# Patient Record
Sex: MALE | Race: WHITE
[De-identification: names, ages, dates, MRNs, and addresses within clinical notes are randomized per-mention and may not be internally consistent; named-entity substitution may affect disease eponyms.]

---

## 2017-06-15 ENCOUNTER — HOSPITAL ENCOUNTER (EMERGENCY)
Dept: HOSPITAL 85 - ER | Age: 67
LOS: 1 days | Discharge: HOME | End: 2017-06-16
Payer: MEDICARE

## 2017-06-15 DIAGNOSIS — E11.43: ICD-10-CM

## 2017-06-15 DIAGNOSIS — Z79.82: ICD-10-CM
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
## 2017-06-15 DIAGNOSIS — Z79.4: ICD-10-CM

## 2017-06-15 DIAGNOSIS — I10: ICD-10-CM

## 2017-06-15 DIAGNOSIS — Z79.899: ICD-10-CM
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
## 2017-06-15 DIAGNOSIS — S96.911A: Primary | ICD-10-CM

## 2017-06-15 PROCEDURE — 99283 EMERGENCY DEPT VISIT LOW MDM: CPT

## 2017-06-15 PROCEDURE — 99282 EMERGENCY DEPT VISIT SF MDM: CPT

## 2017-06-15 NOTE — RADIOLOGY REPORT
HISTORY:

Ankle pain.



COMPARISON:

None.



FINDINGS:

Three views of the ankle obtained.

There is no acute fracture. Post internal fixation of the healing distal fibular fracture with hardwa
re intact and in anatomic alignment. There is no lytic or sclerotic lesion.  The ankle mortise is int
act.  There is diffuse soft tissue swelling.  No significant degenerative changes are noted.



IMPRESSION: 

1.  Post internal fixation of the healing distal fibular fracture with hardware intact and in anatomi
c alignment.

2.  Diffuse soft tissue swelling.



Final Electronic Signature: This report was electronically signed by Ellen Hutchins MD on 6/15/2017
 11:39 PM. 

 [email or web address] /

## 2017-06-15 NOTE — RADIOLOGY REPORT
HISTORY:

Ankle pain.



COMPARISON:

None.



FINDINGS:

2 views of the tibia and fibula obtained.  There is no fracture. The distal fibular fixation hardware
 is better visualized on the dedicated ankle radiographs. There is no lytic or sclerotic lesion.  The
re is no soft tissue swelling.  There is normal alignment and mineralization.



IMPRESSION: Distal fibular hardware better visualized on the dedicated ankle radiographs, otherwise u
nremarkable unremarkable.



Final Electronic Signature: This report was electronically signed by Ellen Hutchins MD on 6/15/2017
 11:41 PM. 

 [email or web address] /

## 2017-06-16 NOTE — ER NURSING DOCUMENTATION
Nurse's Notes                                                                                     

[facility]                                                                         

Name:Iván Mooney                                                                                  

Age:66 yrs                                                                                        

Sex:Male                                                                                          

:1950                                                                                    

MRN:[id number]                                                                                    

Arrival Date:06/15/2017                                                                           

Time:22:35                                                                                        

Account#:[id number]                                                                             

Bed4                                                                                              

Private MD:Physician, No                                                                          

Diagnosis:Ankle Sprain                                                                            

                                                                                                  

Presentation:                                                                                     

06/15                                                                                             

22:46 Acuity: CHANG 3                                                                           mk2 

22:57 Presenting complaint: Patient states: Fell this AM at 0900 injuring right ankle. Has    sj  

      been walking on it all day though very swollen. Care prior to arrival: None. Mechanism      

      of Injury: Fall. Trauma event details: Injury occurred in the [facility]      

      Injury occurred at home. Injury occurred Sandra 15, 2017 Injury occurred at 09:00.            

22:57 Method Of Arrival: Private Vehicle                                                      sj  

                                                                                                  

Historical:                                                                                       

- Allergies: PENICILLINS; SULFA (SULFONAMIDES); SHELLFISH; fela brush;                            

- Home Meds:                                                                                      

  1. levothyroxine oral                                                                           

  2. Albuterol Inhl                                                                               

  3. Combivent Inhl                                                                               

  4. Aspirin Oral                                                                                 

  5. atorvastatin oral                                                                            

  6. Dexilant oral                                                                                

  7. Lexapro Oral                                                                                 

  8. advair HFA                                                                                   

  9. Novolog Sub-Q                                                                                

  10. losartan oral                                                                               

  11. Melatonin Oral                                                                              

- PMHx: HYPOTHYROIDISM; Diabetes - IDDM; hypercholesterolemia; Asthma; Hypertension;              

  DEPRESSION; GERD;                                                                               

- PSHx: Cholecysectomy; back; bilateral ankle plates; Tonsillectomy;                              

- Tetanus: < 10 years.                                                                            

                                                                                                  

                                                                                                  

Screenin:01 Abuse screen: Denies threats or abuse. Denies injuries from another. Nutritional        sj  

      screening: On diabetic diet. Tuberculosis screening: No symptoms or risk factors            

      identified.                                                                                 

                                                                                                  

Assessment:                                                                                       

22:58 General: Appears in no apparent distress, Behavior is cooperative, pleasant. Pain:      sj  

      Denies pain. Neuro: Level of Consciousness is awake, alert, Oriented to person, place,      

      time, event, paresthesias in bilateral lower extremities, baseline. Cardiovascular:         

      Capillary refill < 3 seconds. Respiratory: Respiratory effort is even, unlabored,           

      Respiratory pattern is regular. Injury Description: swelling to ankle.                      

                                                                                                  

Vital Signs:                                                                                      

23:00  / 69; Pulse 70; Resp 16; Temp 98.7(O); Pulse Ox 96% on R/A; Weight 115.21 kg;    sj  

      Height 6 ft. 1 in. (185.42 cm); Pain 0/10;                                                  

                                                                                             

00:32  / 62; Pulse 79; Pulse Ox 95% on R/A; Pain 3/10;                                  2 

06/15                                                                                             

23:00 Body Mass Index 33.51 (115.21 kg, 185.42 cm)                                              

                                                                                                  

Trauma Score (Adult):                                                                             

06/15                                                                                             

23:00 Eye Response: spontaneous(1); Verbal Response: oriented(1); Motor Response: obeys         

      commands(2); Systolic BP: > 89 mm Hg(4); Respiratory Rate: 10 to 29 per min(4); Priscila     

      Score: 15; Trauma Score: 12                                                                 

                                                                                                  

ED Course:                                                                                        

22:36 Patient arrived in ED.                                                                  ma1 

22:36 Physician, No is Private Physician.                                                     ma1 

22:42 Juan Peacock MD is Attending Physician.                                                  tl1 

22:44 Reyna Law RN is Primary Nurse.                                                       2 

22:46 Triage completed.                                                                       2 

23:01 Valuables Given to family. Patient has correct armband on for positive identification.    

      Call light in reach. Side rails up X 1.                                                     

23:02 Affected limb elevated. Ice pack applied.                                                 

23:15 Port Xray Completed.                                                                    daniela 

                                                                                             

00:09 Report received from LAKESHA Li. Warm blanket given. scrub pants given. Pt has walker at [facility] 

      home he prefers to use instead of crutches.                                                 

00:10 Walking boot applied.                                                                   [facility] 

00:22 Resting quietly. Foot is elevated. Ice packs given for take home use.                   2 

00:34 Valuables Remains with patient.                                                         2 

                                                                                                  

Administered Medications:                                                                         

No medications were administered                                                                  

                                                                                                  

                                                                                                  

Outcome:                                                                                          

00:20 Discharge ordered by MD.                                                                1 

00:32 Discharged to home via wheelchair.                                                      2 

00:32 Condition: stable                                                                           

00:32 Discharge Assessment: Patient awake, alert and oriented x 3.  No cognitive and/or           

      functional deficits noted. Patient verbalized understanding of disposition                  

      instructions.  Pt affected extremity is in a boot. Toe color is normal per pt.              

      Immediate cap refill but toes are purple/bluish in color. Family states this is normal.     

                                                                                                  

00:32 Discharge instructions given to patient, family, Instructed on discharge instructions,      

      follow up and referral plans. medication usage.                                             

00:34 Patient left the ED.                                                                    [facility] 

                                                                                                  

Signatures:                                                                                       

Niki Patel Meg, RN                         RN   [facility]                                                  

Juan Peacock MD MD   1                                                  

Mayra, Jen                                sj                                                   

Chema, Arely                             ma1                                                  

                                                                                                  

**************************************************************************************************

## 2017-06-18 NOTE — ER PHYSICIAN DOCUMENTATION
Physician Documentation                                                                           

[facility]                                                                         

Name:Iván Mooney                                                                                  

Age:66 yrs                                                                                        

Sex:Male                                                                                          

:1950                                                                                    

MRN:[id number]                                                                                    

Arrival Date:06/15/2017                                                                           

Time:22:35                                                                                        

Account#:[id number]                                                                             

Bed4                                                                                              

Private MD:Physician, No                                                                          

ED Juan Carrington                                                                            

Disposition:                                                                                      

                                                                                             

12:14 Chart complete.                                                                         tl1 

                                                                                                  

Disposition:                                                                                      

17 00:20 Discharged to Home/Self Care. Impression: Ankle Sprain.                            

- Condition is Good.                                                                              

- Discharge Instructions: Ankle - SPRAIN ANKLE (w/ x-ray).                                        

                                                                                                  

- Medical Reconciliation form form.                                                               

- Follow up: Private Physician; When: 4- 6 days; Reason: Recheck today's complaints,              

  Continuance of care.                                                                            

- Problem is new.                                                                                 

- Symptoms have improved.                                                                         

                                                                                                  

- Notes: SEE YOUR ORTHOPEDIST BACK HOME IN [place] WITHIN A WEEK. KEEP YOUR RIGHT ANKLE           

  ELEVATED AS MUCH AS POSSIBLE.                                                                   

                                                                                                  

                                                                                                  

HPI:                                                                                              

06/15                                                                                             

22:38 This 66 yrs old  Male presents to ER via Private Vehicle with complaints of    tl1 

      Fall Injury.                                                                                

22:38 Details of fall: The patient fell from an upright position. Onset: The                  tl1 

      symptom(s)/episode began/occurred this morning. Associated injuries: The patient            

      sustained right ankle, swelling.                                                            

22:38 Associated signs and symptoms: The patient has no apparent associated signs or          tl1 

      symptoms. He has a h/o DM and severe peripheral neuropathy and chronic venous               

      insufficiency. He is visiting with his wife from [place]. He stumbled this AM and fell,     

      injuring his right ankle. He has no pain perception in his lower legs and has been          

      walking around on it for the last 12 hours or so. Just recently he noticed it was very      

      swollen and thought he should have it checked out. Other than ankle swelling he has no      

      new complaints..                                                                            

                                                                                                  

Historical:                                                                                       

- Allergies: PENICILLINS; SULFA (SULFONAMIDES); SHELLFISH; fela brush;                            

- Home Meds:                                                                                      

  1. levothyroxine oral                                                                           

  2. Albuterol Inhl                                                                               

  3. Combivent Inhl                                                                               

  4. Aspirin Oral                                                                                 

  5. atorvastatin oral                                                                            

  6. Dexilant oral                                                                                

  7. Lexapro Oral                                                                                 

  8. advair HFA                                                                                   

  9. Novolog Sub-Q                                                                                

  10. losartan oral                                                                               

  11. Melatonin Oral                                                                              

- PMHx: HYPOTHYROIDISM; Diabetes - IDDM; hypercholesterolemia; Asthma; Hypertension;              

  DEPRESSION; GERD;                                                                               

- PSHx: Cholecysectomy; back; bilateral ankle plates; Tonsillectomy;                              

- Tetanus: < 10 years.                                                                            

                                                                                                  

                                                                                                  

ROS:                                                                                              

23:05 MS/extremity: Positive for swelling, Negative for pain, tenderness.                     tl1 

23:05 All other systems are negative.                                                             

                                                                                                  

Exam:                                                                                             

23:05 Constitutional:  This is a well developed, well nourished patient who is awake, alert,  tl1 

      and in no acute distress.                                                                   

23:05 Head/face: Exam is negative for acute changes.                                              

23:05 Neck: Exam negative for acute changes.                                                      

23:05 Cardiovascular: Rate: normal.                                                               

23:05 Respiratory:  Respirations: normal.                                                         

23:05 Musculoskeletal/extremity: Extremities:  noted in the right ankle: erythema, swelling,      

      Joints:  the  right ankle displays limited range of motion, swelling,  He cannot            

      perceive pain or tendernes sanywher in his ankle.  There is marked diffuse swelling.        

      Cannot palpate pulses in the feet, but he does have about 3 sec capillary refill..          

23:05 Neuro:  grossly normal.                                                                     

                                                                                                  

Vital Signs:                                                                                      

23:00  / 69; Pulse 70; Resp 16; Temp 98.7(O); Pulse Ox 96% on R/A; Weight 115.21 kg;    sj  

      Height 6 ft. 1 in. (185.42 cm); Pain 0/10;                                                  

16                                                                                             

00:32  / 62; Pulse 79; Pulse Ox 95% on R/A; Pain 3/10;                                  mk2 

06/15                                                                                             

23:00 Body Mass Index 33.51 (115.21 kg, 185.42 cm)                                              

                                                                                                  

Trauma Score (Adult):                                                                             

06/15                                                                                             

23:00 Eye Response: spontaneous(1); Verbal Response: oriented(1); Motor Response: obeys         

      commands(2); Systolic BP: > 89 mm Hg(4); Respiratory Rate: 10 to 29 per min(4); [place]     

      Score: 15; Trauma Score: 12                                                                 

                                                                                                  

Procedures:                                                                                       

23:05 Splinting: Splint applied to medial aspect of right calf, right ankle and medial aspect tl1 

      of right foot using Ortho 3D boot, applied by nurse. Patient tolerated well.                

                                                                                                  

MDM:                                                                                              

22:40 Patient medically screened.                                                             tl1 

23:05 Differential diagnosis: contusion, fracture, sprain, strain. Data reviewed: vital       tl1 

      signs, nurses notes, radiologic studies, plain films, and as a result, I will discharge     

      patient. Test interpretation: by ED physician or midlevel provider: plain radiologic        

      studies. Counseling: I had a detailed discussion with the patient and/or guardian           

      regarding: the historical points, exam findings, and any diagnostic results supporting      

      the discharge/admit diagnosis, radiology results, the need for outpatient follow up, to     

      return to the emergency department if symptoms worsen or persist or if there are any        

      questions or concerns that arise at home. Response to treatment: the patient's symptoms     

      have mildly improved after treatment, and as a result, I will discharge patient.            

      Special discussion: Definite need for orthopedic f/u.                                       

                                                                                                  

06/15                                                                                             

23:43 Order name: ANKLE; 3V COMPLETE RT [id number]                                                 [facility]

06/15                                                                                             

23:44 Order name: TIBIA/FIBULA; 2V RT [id number]                                                   [facility]

06/15                                                                                             

23:18 Order name: Walking Boot; Complete Time: 00:09                                          tl1 

                                                                                                  

Dispensed Medications:                                                                            

No medications were administered                                                                  

                                                                                                  

                                                                                                  

Signatures:                                                                                       

Reyna Law RN                         RN   mk2                                                  

Juan Peacock MD MD   tl1                                                  

Jen Nunez                                                   

                                                                                                  

**************************************************************************************************

## 2018-02-28 ENCOUNTER — HOSPITAL ENCOUNTER (OUTPATIENT)
Dept: HOSPITAL 89 - LAB | Age: 68
End: 2018-02-28
Attending: INTERNAL MEDICINE
Payer: MEDICARE

## 2018-02-28 VITALS — BODY MASS INDEX: 32.32 KG/M2

## 2018-02-28 DIAGNOSIS — E10.49: Primary | ICD-10-CM

## 2018-02-28 LAB — LDLC SERPL-MCNC: 59 MG/DL

## 2018-02-28 PROCEDURE — 36415 COLL VENOUS BLD VENIPUNCTURE: CPT

## 2018-02-28 PROCEDURE — 83036 HEMOGLOBIN GLYCOSYLATED A1C: CPT

## 2018-02-28 PROCEDURE — 82465 ASSAY BLD/SERUM CHOLESTEROL: CPT

## 2018-02-28 PROCEDURE — 84478 ASSAY OF TRIGLYCERIDES: CPT

## 2018-02-28 PROCEDURE — 84443 ASSAY THYROID STIM HORMONE: CPT

## 2018-02-28 PROCEDURE — 82565 ASSAY OF CREATININE: CPT

## 2018-02-28 PROCEDURE — 82570 ASSAY OF URINE CREATININE: CPT

## 2018-02-28 PROCEDURE — 82947 ASSAY GLUCOSE BLOOD QUANT: CPT

## 2018-02-28 PROCEDURE — 84520 ASSAY OF UREA NITROGEN: CPT

## 2018-02-28 PROCEDURE — 84132 ASSAY OF SERUM POTASSIUM: CPT

## 2018-02-28 PROCEDURE — 82310 ASSAY OF CALCIUM: CPT

## 2018-02-28 PROCEDURE — 82435 ASSAY OF BLOOD CHLORIDE: CPT

## 2018-02-28 PROCEDURE — 82374 ASSAY BLOOD CARBON DIOXIDE: CPT

## 2018-02-28 PROCEDURE — 84295 ASSAY OF SERUM SODIUM: CPT

## 2018-02-28 PROCEDURE — 82043 UR ALBUMIN QUANTITATIVE: CPT

## 2018-02-28 PROCEDURE — 83718 ASSAY OF LIPOPROTEIN: CPT

## 2018-05-30 ENCOUNTER — HOSPITAL ENCOUNTER (OUTPATIENT)
Dept: HOSPITAL 89 - LAB | Age: 68
End: 2018-05-30
Attending: INTERNAL MEDICINE
Payer: MEDICARE

## 2018-05-30 VITALS — BODY MASS INDEX: 32.32 KG/M2

## 2018-05-30 DIAGNOSIS — E10.65: Primary | ICD-10-CM

## 2018-05-30 DIAGNOSIS — E06.3: ICD-10-CM

## 2018-05-30 DIAGNOSIS — E03.8: ICD-10-CM

## 2018-05-30 PROCEDURE — 84132 ASSAY OF SERUM POTASSIUM: CPT

## 2018-05-30 PROCEDURE — 84295 ASSAY OF SERUM SODIUM: CPT

## 2018-05-30 PROCEDURE — 82310 ASSAY OF CALCIUM: CPT

## 2018-05-30 PROCEDURE — 82374 ASSAY BLOOD CARBON DIOXIDE: CPT

## 2018-05-30 PROCEDURE — 84520 ASSAY OF UREA NITROGEN: CPT

## 2018-05-30 PROCEDURE — 82947 ASSAY GLUCOSE BLOOD QUANT: CPT

## 2018-05-30 PROCEDURE — 83036 HEMOGLOBIN GLYCOSYLATED A1C: CPT

## 2018-05-30 PROCEDURE — 84443 ASSAY THYROID STIM HORMONE: CPT

## 2018-05-30 PROCEDURE — 82565 ASSAY OF CREATININE: CPT

## 2018-05-30 PROCEDURE — 82435 ASSAY OF BLOOD CHLORIDE: CPT

## 2018-05-30 PROCEDURE — 36415 COLL VENOUS BLD VENIPUNCTURE: CPT

## 2018-10-05 ENCOUNTER — HOSPITAL ENCOUNTER (OUTPATIENT)
Dept: HOSPITAL 89 - LAB | Age: 68
End: 2018-10-05
Attending: INTERNAL MEDICINE
Payer: MEDICARE

## 2018-10-05 VITALS — BODY MASS INDEX: 32.32 KG/M2

## 2018-10-05 DIAGNOSIS — E10.49: Primary | ICD-10-CM

## 2018-10-05 LAB — LDLC SERPL-MCNC: 44 MG/DL

## 2018-10-05 PROCEDURE — 82947 ASSAY GLUCOSE BLOOD QUANT: CPT

## 2018-10-05 PROCEDURE — 82374 ASSAY BLOOD CARBON DIOXIDE: CPT

## 2018-10-05 PROCEDURE — 83718 ASSAY OF LIPOPROTEIN: CPT

## 2018-10-05 PROCEDURE — 82465 ASSAY BLD/SERUM CHOLESTEROL: CPT

## 2018-10-05 PROCEDURE — 82565 ASSAY OF CREATININE: CPT

## 2018-10-05 PROCEDURE — 82310 ASSAY OF CALCIUM: CPT

## 2018-10-05 PROCEDURE — 83036 HEMOGLOBIN GLYCOSYLATED A1C: CPT

## 2018-10-05 PROCEDURE — 82043 UR ALBUMIN QUANTITATIVE: CPT

## 2018-10-05 PROCEDURE — 84478 ASSAY OF TRIGLYCERIDES: CPT

## 2018-10-05 PROCEDURE — 84295 ASSAY OF SERUM SODIUM: CPT

## 2018-10-05 PROCEDURE — 82435 ASSAY OF BLOOD CHLORIDE: CPT

## 2018-10-05 PROCEDURE — 84132 ASSAY OF SERUM POTASSIUM: CPT

## 2018-10-05 PROCEDURE — 84520 ASSAY OF UREA NITROGEN: CPT

## 2018-10-05 PROCEDURE — 36415 COLL VENOUS BLD VENIPUNCTURE: CPT

## 2019-05-07 ENCOUNTER — HOSPITAL ENCOUNTER (OUTPATIENT)
Dept: HOSPITAL 89 - LAB | Age: 69
End: 2019-05-07
Attending: INTERNAL MEDICINE
Payer: MEDICARE

## 2019-05-07 VITALS — BODY MASS INDEX: 32.32 KG/M2

## 2019-05-07 DIAGNOSIS — E10.49: Primary | ICD-10-CM

## 2019-05-07 DIAGNOSIS — E06.3: ICD-10-CM

## 2019-05-07 DIAGNOSIS — E03.8: ICD-10-CM

## 2019-05-07 PROCEDURE — 82040 ASSAY OF SERUM ALBUMIN: CPT

## 2019-05-07 PROCEDURE — 82247 BILIRUBIN TOTAL: CPT

## 2019-05-07 PROCEDURE — 84075 ASSAY ALKALINE PHOSPHATASE: CPT

## 2019-05-07 PROCEDURE — 84443 ASSAY THYROID STIM HORMONE: CPT

## 2019-05-07 PROCEDURE — 82947 ASSAY GLUCOSE BLOOD QUANT: CPT

## 2019-05-07 PROCEDURE — 84520 ASSAY OF UREA NITROGEN: CPT

## 2019-05-07 PROCEDURE — 84155 ASSAY OF PROTEIN SERUM: CPT

## 2019-05-07 PROCEDURE — 82374 ASSAY BLOOD CARBON DIOXIDE: CPT

## 2019-05-07 PROCEDURE — 82310 ASSAY OF CALCIUM: CPT

## 2019-05-07 PROCEDURE — 84295 ASSAY OF SERUM SODIUM: CPT

## 2019-05-07 PROCEDURE — 83036 HEMOGLOBIN GLYCOSYLATED A1C: CPT

## 2019-05-07 PROCEDURE — 36415 COLL VENOUS BLD VENIPUNCTURE: CPT

## 2019-05-07 PROCEDURE — 82565 ASSAY OF CREATININE: CPT

## 2019-05-07 PROCEDURE — 84460 ALANINE AMINO (ALT) (SGPT): CPT

## 2019-05-07 PROCEDURE — 84450 TRANSFERASE (AST) (SGOT): CPT

## 2019-05-07 PROCEDURE — 82435 ASSAY OF BLOOD CHLORIDE: CPT

## 2019-05-07 PROCEDURE — 84132 ASSAY OF SERUM POTASSIUM: CPT
